# Patient Record
Sex: MALE | Race: WHITE | NOT HISPANIC OR LATINO | URBAN - METROPOLITAN AREA
[De-identification: names, ages, dates, MRNs, and addresses within clinical notes are randomized per-mention and may not be internally consistent; named-entity substitution may affect disease eponyms.]

---

## 2020-04-29 ENCOUNTER — EMERGENCY (EMERGENCY)
Facility: HOSPITAL | Age: 25
LOS: 0 days | Discharge: HOME | End: 2020-04-29
Attending: EMERGENCY MEDICINE | Admitting: EMERGENCY MEDICINE
Payer: MEDICAID

## 2020-04-29 VITALS
SYSTOLIC BLOOD PRESSURE: 137 MMHG | DIASTOLIC BLOOD PRESSURE: 76 MMHG | WEIGHT: 199.96 LBS | OXYGEN SATURATION: 97 % | HEART RATE: 93 BPM | TEMPERATURE: 99 F | RESPIRATION RATE: 18 BRPM

## 2020-04-29 DIAGNOSIS — H60.92 UNSPECIFIED OTITIS EXTERNA, LEFT EAR: ICD-10-CM

## 2020-04-29 DIAGNOSIS — H92.09 OTALGIA, UNSPECIFIED EAR: ICD-10-CM

## 2020-04-29 PROCEDURE — 99283 EMERGENCY DEPT VISIT LOW MDM: CPT

## 2020-04-29 RX ORDER — NEOMYCIN/POLYMYXIN B/HYDROCORT
3 SUSPENSION, DROPS(FINAL DOSAGE FORM)(ML) OTIC (EAR)
Qty: 1 | Refills: 0
Start: 2020-04-29 | End: 2020-05-05

## 2020-04-29 NOTE — ED PROVIDER NOTE - CARE PROVIDER_API CALL
Solitario Olson)  Otolaryngology  [address] [place] [address], [address] [place] [address]  Phone: [phone]  Fax: [phone]  Follow Up Time: 1-3 Days

## 2020-04-29 NOTE — ED PROVIDER NOTE - NS ED ROS FT
Constitutional: (-) fever  Eyes/ENT: (-) blurry vision, (-) epistaxis  Cardiovascular: (-) chest pain, (-) syncope  Respiratory: (-) cough, (-) shortness of breath  Neurological: (-) headache, (-) altered mental status

## 2020-04-29 NOTE — ED PROVIDER NOTE - CHPI ED SYMPTOMS NEG
no blurred vision/no fever/no loss of consciousness/no nausea/no syncope/no numbness/no weakness/no vomiting/no change in level of consciousness/no chills

## 2020-04-29 NOTE — ED PROVIDER NOTE - CLINICAL SUMMARY MEDICAL DECISION MAKING FREE TEXT BOX
pt no pmhx  pw left ear pain x 1 week - exam  neuro intact  tragal tenderness no mastoid tenderness  + inflammation left EAC. TM wnl -  otic abx given   Patient to be discharged from ED. Any available test results were discussed with and printed  for patient.  Verbal instructions given, including instructions to return to ED immediately for any new, worsening, or concerning symptoms. Limitations of ED work up discussed.  Patient reports understanding of above with capacity and insight. Written discharge instructions additionally given, including follow-up plan with ent

## 2020-04-29 NOTE — ED PROVIDER NOTE - PHYSICAL EXAMINATION
Physical Exam    Vital Signs: I have reviewed the initial vital signs.  Constitutional: well-nourished, appears stated age, no acute distress  Eyes: Conjunctiva pink, Sclera clear, PERRLA, EOMI.  Ear: Right TM nml, Left canal erythematous swollen with debri, TM is intact   Integumentary: warm, dry, no rash  Neurologic: awake, alert, cranial nerves II-XII grossly intact, extremities’ motor and sensory functions grossly intact  Psychiatric: appropriate mood, appropriate affect

## 2020-04-29 NOTE — ED PROVIDER NOTE - PATIENT PORTAL LINK FT
You can access the FollowMyHealth Patient Portal offered by [facility] by registering at the following website: [email or web address]. By joining [facility] [employer]’s FollowMyHealth portal, you will also be able to view your health information using other applications (apps) compatible with our system.

## 2023-05-18 NOTE — ED ADULT NURSE NOTE - NS_ED_NURSE_TEACHING_TOPIC_ED_A_ED
Detail Level: Detailed
Quality 130: Documentation Of Current Medications In The Medical Record: Current Medications Documented
Quality 431: Preventive Care And Screening: Unhealthy Alcohol Use - Screening: Patient not identified as an unhealthy alcohol user when screened for unhealthy alcohol use using a systematic screening method
Quality 226: Preventive Care And Screening: Tobacco Use: Screening And Cessation Intervention: Patient screened for tobacco use and is an ex/non-smoker
Medications/ENT

## 2025-01-04 ENCOUNTER — EMERGENCY (EMERGENCY)
Facility: HOSPITAL | Age: 30
LOS: 0 days | Discharge: HOME | End: 2025-01-04
Attending: EMERGENCY MEDICINE
Payer: MEDICAID

## 2025-01-04 VITALS
SYSTOLIC BLOOD PRESSURE: 145 MMHG | OXYGEN SATURATION: 96 % | DIASTOLIC BLOOD PRESSURE: 99 MMHG | HEART RATE: 109 BPM | RESPIRATION RATE: 18 BRPM | WEIGHT: 197.98 LBS | TEMPERATURE: 98 F

## 2025-01-04 DIAGNOSIS — Z23 ENCOUNTER FOR IMMUNIZATION: ICD-10-CM

## 2025-01-04 DIAGNOSIS — S61.253A OPEN BITE OF LEFT MIDDLE FINGER WITHOUT DAMAGE TO NAIL, INITIAL ENCOUNTER: ICD-10-CM
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
## 2025-01-04 DIAGNOSIS — Z98.890 OTHER SPECIFIED POSTPROCEDURAL STATES: Chronic | ICD-10-CM
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
## 2025-01-04 RX ORDER — AMOXICILLIN AND CLAVULANATE POTASSIUM 500; 125 MG/1; MG/1
1 TABLET, FILM COATED ORAL
Qty: 20 | Refills: 0
Start: 2025-01-04 | End: 2025-01-13

## 2025-01-04 RX ORDER — AMOXICILLIN AND CLAVULANATE POTASSIUM 500; 125 MG/1; MG/1
1 TABLET, FILM COATED ORAL ONCE
Refills: 0 | Status: DISCONTINUED | OUTPATIENT
Start: 2025-01-04 | End: 2025-01-04

## 2025-01-04 RX ORDER — LIDOCAINE HCL/PF 10 MG/ML
20 VIAL (ML) INJECTION ONCE
Refills: 0 | Status: COMPLETED | OUTPATIENT
Start: 2025-01-04 | End: 2025-01-04

## 2025-01-04 RX ADMIN — Medication 20 MILLILITER(S): at 21:29

## 2025-01-05 PROBLEM — J30.2 OTHER SEASONAL ALLERGIC RHINITIS: Chronic | Status: ACTIVE | Noted: 2020-04-29

## 2025-01-09 ENCOUNTER — APPOINTMENT (OUTPATIENT)
Dept: ORTHOPEDIC SURGERY | Facility: CLINIC | Age: 30
End: 2025-01-09
Payer: MEDICAID

## 2025-01-09 ENCOUNTER — TRANSCRIPTION ENCOUNTER (OUTPATIENT)
Age: 30
End: 2025-01-09

## 2025-01-09 PROBLEM — Z00.00 ENCOUNTER FOR PREVENTIVE HEALTH EXAMINATION: Status: ACTIVE | Noted: 2025-01-09

## 2025-01-09 PROCEDURE — 99203 OFFICE O/P NEW LOW 30 MIN: CPT

## 2025-01-16 ENCOUNTER — APPOINTMENT (OUTPATIENT)
Dept: ORTHOPEDIC SURGERY | Facility: CLINIC | Age: 30
End: 2025-01-16
Payer: MEDICAID

## 2025-01-16 DIAGNOSIS — S61.259A OPEN BITE OF UNSPECIFIED FINGER W/OUT DAMAGE TO NAIL, INITIAL ENCOUNTER: ICD-10-CM
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
## 2025-01-16 PROCEDURE — 99213 OFFICE O/P EST LOW 20 MIN: CPT

## 2025-02-13 ENCOUNTER — APPOINTMENT (OUTPATIENT)
Dept: ORTHOPEDIC SURGERY | Facility: CLINIC | Age: 30
End: 2025-02-13
Payer: MEDICAID
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
## 2025-02-13 DIAGNOSIS — S61.259A OPEN BITE OF UNSPECIFIED FINGER W/OUT DAMAGE TO NAIL, INITIAL ENCOUNTER: ICD-10-CM

## 2025-02-13 PROCEDURE — 99213 OFFICE O/P EST LOW 20 MIN: CPT

## 2025-03-27 ENCOUNTER — APPOINTMENT (OUTPATIENT)
Dept: ORTHOPEDIC SURGERY | Facility: CLINIC | Age: 30
End: 2025-03-27

## 2025-04-03 ENCOUNTER — APPOINTMENT (OUTPATIENT)
Dept: ORTHOPEDIC SURGERY | Facility: CLINIC | Age: 30
End: 2025-04-03
Payer: MEDICAID
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
## 2025-04-03 DIAGNOSIS — S69.91XA UNSPECIFIED INJURY OF RIGHT WRIST, HAND AND FINGER(S), INITIAL ENCOUNTER: ICD-10-CM

## 2025-04-03 DIAGNOSIS — S61.259A OPEN BITE OF UNSPECIFIED FINGER W/OUT DAMAGE TO NAIL, INITIAL ENCOUNTER: ICD-10-CM

## 2025-04-03 PROCEDURE — 99213 OFFICE O/P EST LOW 20 MIN: CPT

## 2025-05-07 ENCOUNTER — NON-APPOINTMENT (OUTPATIENT)
Age: 30
End: 2025-05-07

## 2025-05-09 ENCOUNTER — APPOINTMENT (OUTPATIENT)
Dept: ORTHOPEDIC SURGERY | Facility: CLINIC | Age: 30
End: 2025-05-09
Payer: COMMERCIAL
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
## 2025-05-09 DIAGNOSIS — S61.259A OPEN BITE OF UNSPECIFIED FINGER W/OUT DAMAGE TO NAIL, INITIAL ENCOUNTER: ICD-10-CM

## 2025-05-09 DIAGNOSIS — S69.91XA UNSPECIFIED INJURY OF RIGHT WRIST, HAND AND FINGER(S), INITIAL ENCOUNTER: ICD-10-CM

## 2025-05-09 PROCEDURE — 99213 OFFICE O/P EST LOW 20 MIN: CPT

## 2025-09-02 ENCOUNTER — NON-APPOINTMENT (OUTPATIENT)
Age: 30
End: 2025-09-02